# Patient Record
Sex: FEMALE | Race: ASIAN | NOT HISPANIC OR LATINO
[De-identification: names, ages, dates, MRNs, and addresses within clinical notes are randomized per-mention and may not be internally consistent; named-entity substitution may affect disease eponyms.]

---

## 2017-04-03 ENCOUNTER — CHARTING TRANS (OUTPATIENT)
Dept: OTHER | Age: 82
End: 2017-04-03

## 2017-05-11 ENCOUNTER — CHARTING TRANS (OUTPATIENT)
Dept: OTHER | Age: 82
End: 2017-05-11

## 2018-09-10 ENCOUNTER — CHARTING TRANS (OUTPATIENT)
Dept: OTHER | Age: 83
End: 2018-09-10

## 2019-01-01 ENCOUNTER — HOSPITAL ENCOUNTER (INPATIENT)
Facility: HOSPITAL | Age: 84
LOS: 1 days | DRG: 871 | End: 2019-01-01
Attending: EMERGENCY MEDICINE | Admitting: HOSPITALIST
Payer: MEDICARE

## 2019-01-01 ENCOUNTER — APPOINTMENT (OUTPATIENT)
Dept: GENERAL RADIOLOGY | Facility: HOSPITAL | Age: 84
DRG: 871 | End: 2019-01-01
Attending: EMERGENCY MEDICINE
Payer: MEDICARE

## 2019-01-01 ENCOUNTER — APPOINTMENT (OUTPATIENT)
Dept: CT IMAGING | Facility: HOSPITAL | Age: 84
DRG: 871 | End: 2019-01-01
Attending: EMERGENCY MEDICINE
Payer: MEDICARE

## 2019-01-01 VITALS
BODY MASS INDEX: 27.33 KG/M2 | WEIGHT: 160.06 LBS | OXYGEN SATURATION: 96 % | DIASTOLIC BLOOD PRESSURE: 55 MMHG | TEMPERATURE: 97 F | HEIGHT: 64 IN | SYSTOLIC BLOOD PRESSURE: 76 MMHG

## 2019-01-01 DIAGNOSIS — N17.9 AKI (ACUTE KIDNEY INJURY) (HCC): ICD-10-CM

## 2019-01-01 DIAGNOSIS — I95.9 HYPOTENSION, UNSPECIFIED HYPOTENSION TYPE: ICD-10-CM

## 2019-01-01 DIAGNOSIS — E87.2 METABOLIC ACIDOSIS: ICD-10-CM

## 2019-01-01 DIAGNOSIS — R19.8 PERFORATED VISCUS: Primary | ICD-10-CM

## 2019-01-01 DIAGNOSIS — D64.9 ANEMIA, UNSPECIFIED TYPE: ICD-10-CM

## 2019-01-01 LAB
ALBUMIN SERPL-MCNC: 2.2 G/DL (ref 3.1–4.5)
ALBUMIN/GLOB SERPL: 0.8 {RATIO} (ref 1–2)
ALLENS TEST: POSITIVE
ALLENS TEST: POSITIVE
ALP LIVER SERPL-CCNC: 61 U/L (ref 55–142)
ALT SERPL-CCNC: 25 U/L (ref 14–54)
ANION GAP SERPL CALC-SCNC: 20 MMOL/L (ref 0–18)
ANTIBODY SCREEN: NEGATIVE
APTT PPP: 34.8 SECONDS (ref 26.1–34.6)
ARTERIAL BLD GAS O2 SATURATION: 92 % (ref 92–100)
ARTERIAL BLD GAS O2 SATURATION: 98 % (ref 92–100)
ARTERIAL BLOOD GAS BASE EXCESS: -12.3
ARTERIAL BLOOD GAS BASE EXCESS: -15.6
ARTERIAL BLOOD GAS HCO3: 12.7 MEQ/L (ref 22–26)
ARTERIAL BLOOD GAS HCO3: 14.3 MEQ/L (ref 22–26)
ARTERIAL BLOOD GAS PCO2: 34 MM HG (ref 35–45)
ARTERIAL BLOOD GAS PCO2: 38 MM HG (ref 35–45)
ARTERIAL BLOOD GAS PH: 7.14 (ref 7.35–7.45)
ARTERIAL BLOOD GAS PH: 7.23 (ref 7.35–7.45)
ARTERIAL BLOOD GAS PO2: 212 MM HG (ref 80–105)
ARTERIAL BLOOD GAS PO2: 78 MM HG (ref 80–105)
AST SERPL-CCNC: 100 U/L (ref 15–41)
ATRIAL RATE: 124 BPM
BAND %: 8 %
BASOPHIL % MANUAL: 0 %
BASOPHIL ABSOLUTE MANUAL: 0 X10(3) UL (ref 0–0.1)
BILIRUB SERPL-MCNC: 0.4 MG/DL (ref 0.1–2)
BILIRUB UR QL STRIP.AUTO: NEGATIVE
BUN BLD-MCNC: 30 MG/DL (ref 8–20)
BUN/CREAT SERPL: 14.9 (ref 10–20)
CALCIUM BLD-MCNC: 7.7 MG/DL (ref 8.3–10.3)
CALCULATED O2 SATURATION: 92 % (ref 92–100)
CALCULATED O2 SATURATION: 99 % (ref 92–100)
CARBOXYHEMOGLOBIN: 1 % SAT (ref 0–3)
CARBOXYHEMOGLOBIN: 1.1 % SAT (ref 0–3)
CHLORIDE SERPL-SCNC: 104 MMOL/L (ref 101–111)
CO2 SERPL-SCNC: 18 MMOL/L (ref 22–32)
COLOR UR AUTO: YELLOW
CREAT BLD-MCNC: 2.02 MG/DL (ref 0.55–1.02)
EOSINOPHIL % MANUAL: 0 %
EOSINOPHIL ABSOLUTE MANUAL: 0 X10(3) UL (ref 0–0.3)
ERYTHROCYTE [DISTWIDTH] IN BLOOD BY AUTOMATED COUNT: 14.6 % (ref 11.5–16)
GLOBULIN PLAS-MCNC: 2.9 G/DL (ref 2.8–4.4)
GLUCOSE BLD-MCNC: 280 MG/DL (ref 65–99)
GLUCOSE BLD-MCNC: 351 MG/DL (ref 70–99)
GLUCOSE UR STRIP.AUTO-MCNC: 50 MG/DL
HCT VFR BLD AUTO: 31.1 % (ref 34–50)
HGB BLD-MCNC: 9.3 G/DL (ref 12–16)
INR BLD: 1.57 (ref 0.9–1.1)
IONIZED CALCIUM: 0.98 MMOL/L (ref 1.12–1.32)
KETONES UR STRIP.AUTO-MCNC: NEGATIVE MG/DL
L/M: 15 L/MIN
L/M: 5.5 L/MIN
LACTIC ACID ARTERIAL: 15.9 MMOL/L (ref 0.5–2)
LACTIC ACID: 14 MMOL/L (ref 0.5–2)
LACTIC ACID: 15.4 MMOL/L (ref 0.5–2)
LEUKOCYTE ESTERASE UR QL STRIP.AUTO: NEGATIVE
LYMPHOCYTE % MANUAL: 4 %
LYMPHOCYTE ABSOLUTE MANUAL: 0.93 X10(3) UL (ref 0.9–4)
M PROTEIN MFR SERPL ELPH: 5.1 G/DL (ref 6.4–8.2)
MCH RBC QN AUTO: 28.7 PG (ref 27–33.2)
MCHC RBC AUTO-ENTMCNC: 29.9 G/DL (ref 31–37)
MCV RBC AUTO: 96 FL (ref 81–100)
METAMYELOCYTE %: 2 %
METAMYELOCYTE ABSOLUTE MANUAL: 0.47 X10(3) UL (ref ?–0.01)
METHEMOGLOBIN: 0.5 % SAT (ref 0.4–1.5)
METHEMOGLOBIN: 0.5 % SAT (ref 0.4–1.5)
MONOCYTE % MANUAL: 3 %
MONOCYTE ABSOLUTE MANUAL: 0.7 X10(3) UL (ref 0.1–1)
MORPHOLOGY: NORMAL
MYELOCYTE %: 2 %
MYELOCYTE ABSOLUTE MANUAL: 0.47 X10(3) UL (ref ?–0.01)
NEUTROPHIL ABS PRELIM: 19.4 X10 (3) UL (ref 1.3–6.7)
NEUTROPHIL ABSOLUTE MANUAL: 20.74 X10(3) UL (ref 1.3–6.7)
NEUTROPHILS % MANUAL: 81 %
NITRITE UR QL STRIP.AUTO: NEGATIVE
NRBC CALCULATED: 1
OSMOLALITY SERPL CALC.SUM OF ELEC: 314 MOSM/KG (ref 275–295)
P AXIS: 22 DEGREES
P-R INTERVAL: 122 MS
PATIENT TEMPERATURE: 96.8 F
PATIENT TEMPERATURE: 97.3 F
PH UR STRIP.AUTO: 6 [PH] (ref 4.5–8)
PLATELET # BLD AUTO: 292 10(3)UL (ref 150–450)
PLATELET MORPHOLOGY: NORMAL
POTASSIUM BLOOD GAS: 4.2 MMOL/L (ref 3.6–5.1)
POTASSIUM SERPL-SCNC: 4.7 MMOL/L (ref 3.6–5.1)
PRO-BETA NATRIURETIC PEPTIDE: 6229 PG/ML (ref ?–450)
PROCALCITONIN SERPL-MCNC: 0.77 NG/ML
PROT UR STRIP.AUTO-MCNC: 100 MG/DL
PSA SERPL DL<=0.01 NG/ML-MCNC: 19.3 SECONDS (ref 12.4–14.7)
Q-T INTERVAL: 328 MS
QRS DURATION: 86 MS
QTC CALCULATION (BEZET): 471 MS
R AXIS: -43 DEGREES
RBC # BLD AUTO: 3.24 X10(6)UL (ref 3.8–5.1)
RED CELL DISTRIBUTION WIDTH-SD: 51.6 FL (ref 35.1–46.3)
RH BLOOD TYPE: POSITIVE
SODIUM BLOOD GAS: 141 MMOL/L (ref 136–144)
SODIUM SERPL-SCNC: 142 MMOL/L (ref 136–144)
SP GR UR STRIP.AUTO: <1.005 (ref 1–1.03)
T AXIS: 143 DEGREES
TOTAL CELLS COUNTED: 100
TOTAL HEMOGLOBIN: 8.3 G/DL (ref 11.7–16)
TOTAL HEMOGLOBIN: 9.1 G/DL (ref 11.7–16)
TROPONIN I SERPL-MCNC: <0.046 NG/ML (ref ?–0.05)
UROBILINOGEN UR STRIP.AUTO-MCNC: <2 MG/DL
VENTRICULAR RATE: 124 BPM
WBC # BLD AUTO: 23.3 X10(3) UL (ref 4–13)

## 2019-01-01 PROCEDURE — 99222 1ST HOSP IP/OBS MODERATE 55: CPT | Performed by: COLON & RECTAL SURGERY

## 2019-01-01 PROCEDURE — 06HM33Z INSERTION OF INFUSION DEVICE INTO RIGHT FEMORAL VEIN, PERCUTANEOUS APPROACH: ICD-10-PCS | Performed by: EMERGENCY MEDICINE

## 2019-01-01 PROCEDURE — 71250 CT THORAX DX C-: CPT | Performed by: EMERGENCY MEDICINE

## 2019-01-01 PROCEDURE — 70450 CT HEAD/BRAIN W/O DYE: CPT | Performed by: EMERGENCY MEDICINE

## 2019-01-01 PROCEDURE — 71045 X-RAY EXAM CHEST 1 VIEW: CPT | Performed by: EMERGENCY MEDICINE

## 2019-01-01 PROCEDURE — 74176 CT ABD & PELVIS W/O CONTRAST: CPT | Performed by: EMERGENCY MEDICINE

## 2019-01-01 PROCEDURE — 99291 CRITICAL CARE FIRST HOUR: CPT | Performed by: HOSPITALIST

## 2019-01-01 RX ORDER — MORPHINE SULFATE 4 MG/ML
1 INJECTION, SOLUTION INTRAMUSCULAR; INTRAVENOUS EVERY 2 HOUR PRN
Status: DISCONTINUED | OUTPATIENT
Start: 2019-01-01 | End: 2019-01-10

## 2019-01-01 RX ORDER — SODIUM CHLORIDE 9 MG/ML
INJECTION, SOLUTION INTRAVENOUS CONTINUOUS
Status: DISCONTINUED | OUTPATIENT
Start: 2019-01-01 | End: 2019-01-01

## 2019-01-01 RX ORDER — AMITRIPTYLINE HYDROCHLORIDE 25 MG/1
25 TABLET, FILM COATED ORAL NIGHTLY
COMMUNITY

## 2019-01-01 RX ORDER — MORPHINE SULFATE 4 MG/ML
2 INJECTION, SOLUTION INTRAMUSCULAR; INTRAVENOUS EVERY 2 HOUR PRN
Status: DISCONTINUED | OUTPATIENT
Start: 2019-01-01 | End: 2019-01-10

## 2019-01-01 RX ORDER — MORPHINE SULFATE 4 MG/ML
6 INJECTION, SOLUTION INTRAMUSCULAR; INTRAVENOUS
Status: DISCONTINUED | OUTPATIENT
Start: 2019-01-01 | End: 2019-01-10

## 2019-01-01 RX ORDER — SODIUM CHLORIDE 9 MG/ML
1000 INJECTION, SOLUTION INTRAVENOUS ONCE
Status: COMPLETED | OUTPATIENT
Start: 2019-01-01 | End: 2019-01-01

## 2019-01-01 RX ORDER — LEVOFLOXACIN 250 MG/1
250 TABLET ORAL DAILY
COMMUNITY

## 2019-01-01 RX ORDER — TEMAZEPAM 15 MG/1
15 CAPSULE ORAL NIGHTLY PRN
COMMUNITY

## 2019-01-01 RX ORDER — METOCLOPRAMIDE HYDROCHLORIDE 5 MG/ML
5 INJECTION INTRAMUSCULAR; INTRAVENOUS EVERY 8 HOURS PRN
Status: DISCONTINUED | OUTPATIENT
Start: 2019-01-01 | End: 2019-01-10

## 2019-01-01 RX ORDER — HYDROMORPHONE HYDROCHLORIDE 1 MG/ML
0.5 INJECTION, SOLUTION INTRAMUSCULAR; INTRAVENOUS; SUBCUTANEOUS ONCE
Status: COMPLETED | OUTPATIENT
Start: 2019-01-01 | End: 2019-01-01

## 2019-01-01 RX ORDER — LORAZEPAM 2 MG/ML
1 INJECTION INTRAMUSCULAR
Status: DISCONTINUED | OUTPATIENT
Start: 2019-01-01 | End: 2019-01-10

## 2019-01-01 RX ORDER — ONDANSETRON 2 MG/ML
4 INJECTION INTRAMUSCULAR; INTRAVENOUS EVERY 6 HOURS PRN
Status: DISCONTINUED | OUTPATIENT
Start: 2019-01-01 | End: 2019-01-10

## 2019-01-01 RX ORDER — MORPHINE SULFATE 4 MG/ML
4 INJECTION, SOLUTION INTRAMUSCULAR; INTRAVENOUS EVERY 2 HOUR PRN
Status: DISCONTINUED | OUTPATIENT
Start: 2019-01-01 | End: 2019-01-10

## 2019-01-01 RX ORDER — POLYETHYLENE GLYCOL 3350
17 POWDER (GRAM) MISCELLANEOUS 2 TIMES DAILY
Refills: 1 | COMMUNITY
Start: 2018-01-01 | End: 2019-01-01

## 2019-01-01 RX ORDER — MORPHINE SULFATE 4 MG/ML
INJECTION, SOLUTION INTRAMUSCULAR; INTRAVENOUS
Status: DISCONTINUED
Start: 2019-01-01 | End: 2019-01-10

## 2019-01-01 RX ORDER — TRAMADOL HYDROCHLORIDE 50 MG/1
50 TABLET ORAL EVERY 6 HOURS PRN
COMMUNITY

## 2019-01-01 RX ORDER — MORPHINE SULFATE 4 MG/ML
4 INJECTION, SOLUTION INTRAMUSCULAR; INTRAVENOUS
Status: DISCONTINUED | OUTPATIENT
Start: 2019-01-01 | End: 2019-01-10

## 2019-01-01 RX ORDER — DULOXETIN HYDROCHLORIDE 30 MG/1
30 CAPSULE, DELAYED RELEASE ORAL DAILY
COMMUNITY

## 2019-01-01 RX ORDER — LORAZEPAM 2 MG/ML
2 INJECTION INTRAMUSCULAR
Status: DISCONTINUED | OUTPATIENT
Start: 2019-01-01 | End: 2019-01-10

## 2019-01-01 RX ORDER — SODIUM CHLORIDE, SODIUM LACTATE, POTASSIUM CHLORIDE, CALCIUM CHLORIDE 600; 310; 30; 20 MG/100ML; MG/100ML; MG/100ML; MG/100ML
INJECTION, SOLUTION INTRAVENOUS CONTINUOUS
Status: DISCONTINUED | OUTPATIENT
Start: 2019-01-01 | End: 2019-01-10

## 2019-01-01 RX ORDER — ACETAMINOPHEN 325 MG/1
650 TABLET ORAL EVERY 4 HOURS PRN
COMMUNITY

## 2019-01-01 RX ORDER — FLUCONAZOLE 2 MG/ML
400 INJECTION, SOLUTION INTRAVENOUS ONCE
Status: COMPLETED | OUTPATIENT
Start: 2019-01-01 | End: 2019-01-01

## 2019-01-01 RX ORDER — SODIUM CHLORIDE 9 MG/ML
1000 INJECTION, SOLUTION INTRAVENOUS ONCE
Status: DISCONTINUED | OUTPATIENT
Start: 2019-01-01 | End: 2019-01-01

## 2019-01-01 RX ORDER — OMEPRAZOLE 40 MG/1
40 CAPSULE, DELAYED RELEASE ORAL DAILY
COMMUNITY

## 2019-01-01 RX ORDER — FLUCONAZOLE 2 MG/ML
200 INJECTION, SOLUTION INTRAVENOUS
Status: DISCONTINUED | OUTPATIENT
Start: 2019-01-11 | End: 2019-01-10

## 2019-01-01 RX ORDER — ENOXAPARIN SODIUM 100 MG/ML
40 INJECTION SUBCUTANEOUS DAILY
Status: DISCONTINUED | OUTPATIENT
Start: 2019-01-01 | End: 2019-01-01

## 2019-01-01 RX ORDER — PREGABALIN 75 MG/1
75 CAPSULE ORAL 2 TIMES DAILY
COMMUNITY

## 2019-01-01 RX ORDER — ONDANSETRON 2 MG/ML
4 INJECTION INTRAMUSCULAR; INTRAVENOUS EVERY 4 HOURS PRN
Status: DISCONTINUED | OUTPATIENT
Start: 2019-01-01 | End: 2019-01-01

## 2019-01-01 RX ORDER — METRONIDAZOLE 250 MG/1
250 TABLET ORAL 3 TIMES DAILY
COMMUNITY
Start: 2019-01-01 | End: 2019-01-16

## 2019-01-01 RX ORDER — MORPHINE SULFATE 4 MG/ML
2 INJECTION, SOLUTION INTRAMUSCULAR; INTRAVENOUS
Status: DISCONTINUED | OUTPATIENT
Start: 2019-01-01 | End: 2019-01-10

## 2019-01-01 RX ORDER — POLYETHYLENE GLYCOL 3350 17 G/17G
17 POWDER, FOR SOLUTION ORAL 2 TIMES DAILY
COMMUNITY

## 2019-01-01 RX ORDER — HEPARIN SODIUM 5000 [USP'U]/ML
5000 INJECTION, SOLUTION INTRAVENOUS; SUBCUTANEOUS EVERY 8 HOURS SCHEDULED
Status: DISCONTINUED | OUTPATIENT
Start: 2019-01-10 | End: 2019-01-10

## 2019-01-09 PROBLEM — D64.9 ANEMIA: Status: ACTIVE | Noted: 2019-01-01

## 2019-01-09 PROBLEM — K66.8 FREE INTRAPERITONEAL AIR: Status: ACTIVE | Noted: 2019-01-01

## 2019-01-09 PROBLEM — N17.9 AKI (ACUTE KIDNEY INJURY) (HCC): Status: ACTIVE | Noted: 2019-01-01

## 2019-01-09 PROBLEM — I95.9 HYPOTENSION, UNSPECIFIED HYPOTENSION TYPE: Status: ACTIVE | Noted: 2019-01-01

## 2019-01-09 PROBLEM — E87.2 METABOLIC ACIDOSIS: Status: ACTIVE | Noted: 2019-01-01

## 2019-01-09 PROBLEM — R19.8 PERFORATED VISCUS: Status: ACTIVE | Noted: 2019-01-01

## 2019-01-09 PROBLEM — D64.9 ANEMIA, UNSPECIFIED TYPE: Status: ACTIVE | Noted: 2019-01-01

## 2019-01-09 PROBLEM — D72.829 LEUKOCYTOSIS: Status: ACTIVE | Noted: 2019-01-01

## 2019-01-09 NOTE — ED NOTES
Alisha respiratory therapist at bedside. Patient remains on NRB O2 mask. ABG drawn as well.  MD brought to bedside aware of patient's BP.

## 2019-01-09 NOTE — H&P
[facility] HOSPITAL[facility]  History and Physical     Elmira Page Patient Status:  Emergency    10/16/1923 MRN [id number]   Location [address] [facility] Attending Frosty Eisenmenger, MD   Hosp Day # 0 PCP No primary care provider on file.      Ch Amitriptyline HCl 25 MG Oral Tab Take 25 mg by mouth nightly. Disp:  Rfl:    DULoxetine HCl 30 MG Oral Cap DR Particles Take 30 mg by mouth daily. Disp:  Rfl:    levofloxacin 250 MG Oral Tab Take 250 mg by mouth daily.  Disp:  Rfl:    pregabalin 75 MG Ora ALKPHO  61   AST  100*   ALT  25   BILT  0.4   TP  5.1*     Recent Labs   Lab  01/09/19   1250   PTP  19.3*   INR  1.57*     Recent Labs   Lab  01/09/19   1225   TROP  <0.046     Imaging: Imaging data reviewed in Epic. ASSESSMENT / PLAN:   1.  Septic miriam

## 2019-01-09 NOTE — ED NOTES
ER MD at bedside to update son with plan of care and diagnosis of bowel perforation. Patient not stable enough for transport to [facility] at this time, not sure if patient is candidate for surgery. Plan to consult with general surgeon here at [facility].  Geronimo

## 2019-01-09 NOTE — PROGRESS NOTES
01/09/19 6120   Clinical Encounter Type   Visited With Family   Routine Visit Introduction   Continue Visiting Yes  ([employer] requested from [address] made the rerquest on an urgent basis .)   Crisis Visit ED   [facility]

## 2019-01-09 NOTE — PROGRESS NOTES
[facility] Pharmacy Note:  Renal Adjustment for fluconazole     Pancho Inman is a 80year old female who has been prescribed fluconazole  100 mg IVPB every 24 hrs. CrCl is estimated creatinine clearance is 14.4 mL/min (A) (based on SCr of 2.02 mg/dL (H)).  so the

## 2019-01-09 NOTE — ED NOTES
Patient taken to CT department at this time with RN, monitor, and respiratory therapist. Patient on NRB O2 mask for transport.

## 2019-01-09 NOTE — CONSULTS
Pulmonary / Critical Care H&P/Consult       NAME: Nilson Benitez - ROOM: [facility] - MRN: [id number] - Age: 80year old - :  10/16/1923    Date of Admission: 2019 11:48 AM  Admission Diagnosis: No admission diagnoses are documented for this encounter.     MAKAYLA Other Topics      Concerns:        Not on file    Social History Narrative      Not on file         Family History:  No family history on file.      Home Medications:    Outpatient Medications Marked as Taking for the 1/9/19 encounter [facility] Encounter): conjunctiva/corneas clear   Neck:   Supple, symmetrical, trachea midline, no adenopathy;        thyroid:  No enlargement/tenderness/nodules; no carotid    bruit or JVD   Back:     Symmetric, no curvature, ROM normal, no CVA tenderness   Lungs:     Clear to 2. Pneumoperitoneum: concerning for perforation of bowel. Recent surgery for perforation with ostomy in place, but suspect air seen now is new event rather than persistent from prior surgery. - gen surg consulted  - empiric zosyn / fluconazole.    3. Re

## 2019-01-09 NOTE — ED NOTES
Dr. Brandon Sessions has placed central line to right femoral. Flushing well and labs being obtained. IV fluid bolus 1 L infusing into central line by pressure bag. Son and daughter at bedside. Remain updated with plan of care.

## 2019-01-09 NOTE — ED NOTES
Dr. Rachael Cantu back at bedside at this time to update patient and family with plan of care. Patient reports she is feeling a little better. Resting with eyes closed but able to communicate with son intermittently speaking in [facility][1]. Color appears better.  VS

## 2019-01-09 NOTE — ED NOTES
Cyndi De Souza from respiratory back at bedside for repeat ABG. Patient opening her eyes intermittently. Daughter in law remains at bedside. Waiting for bed in ICU. No plan for surgery at this time. Claudia Allen has been at bedside with patient and family.

## 2019-01-09 NOTE — ED INITIAL ASSESSMENT (HPI)
Patient arrives by ems from [address] [place] [address]. Pat's residence. Staff/family report patient was with physical therapist, sitting in chair and turned white and stopped responding for 2-3 minutes. Recent discharge yesterday following bowel perforation and surgery.  Patient

## 2019-01-09 NOTE — H&P
[facility]  Report of  Surgical Consultation with History and Physical Exam    Bella Poon Patient Status:  Emergency    10/16/1923 MRN [id number]   Location [address] [facility] [address] Attending No att. providers found   [facility] Day # 0 PCP chest, abdomen, and pelvis. Dose reduction techniques were used. Dose information is transmitted to the [facility] [employer] of Radiology)   NRDR ([address]) which includes the Dose Index Registry.      PATIENT STATED HISTORY: (As t may relate to hollow viscus perforation or recent abdominal surgery. Clinical correlation with the patient's surgical history and physical exam is recommended. 2.  There is a mild to moderate amount of ascites throughout the abdomen and pelvis.      3. REPLACEMENT SURGERY       No family history on file. reports that  has never smoked.  she has never used smokeless tobacco.    Allergies:    Codeine                 UNKNOWN    Medications:    Current Facility-Administered Medications:   •  norepinephrine Perforated viscus     Shock ([facility] [address]     Sepsis ([facility] [address]     Free intraperitoneal air      I had a long conversation with the patient's son and daughter-in-law. I help them through the decision-making process.   I counseled them that their role in her care at

## 2019-01-09 NOTE — PROGRESS NOTES
[facility] Pharmacy Note:  Renal Dose Adjustment for Metoclopramide (REGLAN)    Temi Khan has been prescribed Metoclopramide (REGLAN) 10 mg every 8 hours as needed for n/v.    Estimated Creatinine Clearance: 14.4 mL/min (A) (based on SCr of 2.02 mg/dL (H)).

## 2019-01-09 NOTE — ED NOTES
Patient's son and daughter in law at bedside, remain updated with plan of care. Patient occasionally opening eyes and son talking to her. Not following commands. Sometimes tries to speak a few words to son.  Observed moving upper extremities slightly interm

## 2019-01-09 NOTE — ED NOTES
MD at bedside to insert central line. Verbal consent obtained in emergency situation by son at bedside. Dr. Nawaf Harris at bedside to asssit. US IV to R AC by [employer]. IV fluid bolus infusing into right AC.

## 2019-01-09 NOTE — ED NOTES
Dr. Timothy Calabrese at bedside at this time assessing patient. Son and daughter in law remain at bedside to participate in exam. Patient on 6L nc O2 and tolerating well, 100% O2 sat observed.  Son reports earlier this morning patient was c/o upper abdominal pain,

## 2019-01-09 NOTE — ED NOTES
Son has been in contact with Dr. Dain Gatica general surgeon. Dr. Arturo Ellis at bedside to reassess and update family as well. Son remains unsure about whether they want to move forward with surgery or comfort care.

## 2019-01-10 NOTE — PLAN OF CARE
CARDIOVASCULAR - ADULT    • Maintains optimal cardiac output and hemodynamic stability Not Progressing    • Absence of cardiac arrhythmias or at baseline Not Progressing          NEUROLOGICAL - ADULT    • Achieves stable or improved neurological status Not

## 2019-01-10 NOTE — DISCHARGE SUMMARY
[facility] HOSPITAL[facility]  DISCHARGE SUMMARY     Wilson Valencia Patient Status:  Inpatient    10/16/1923 MRN [id number]   [facility] 4SW-A Attending No att. providers found   [facility] Day # 1 PCP No primary care provider on file.      Date of Admission:

## 2019-01-10 NOTE — PROGRESS NOTES
01/09/19 2051   Clinical Encounter Type   Visited With Family   Routine Visit Follow-up   Continue Visiting No   Crisis Visit Death   Patient's Supportive Strategies/Resources  offered supportive, empathic presence to the grieving family   Refer

## 2019-01-10 NOTE — PROGRESS NOTES
Continued decline in patient condition despite current interventions. Family has decided to purse comfort care. Morphine and ativan pushes ordered. Updated [facility]-on call, Dr. Romana Barefoot.           ELLIOT Simmons  Critical Care Nurse Practitioner  Sp

## 2019-01-10 NOTE — PLAN OF CARE
Received patient at 5. Patient DNR. Levo/vaso maxed. Continues to be hypotensive. Unable to get accurate pulse ox readings. Patient unresponsive. Having periods of apnea. Patient's son/POA Rigoberto Ericas) at bedside. Plan of care discussed.  Ernesto Hernández and family re
